# Patient Record
Sex: FEMALE | URBAN - METROPOLITAN AREA
[De-identification: names, ages, dates, MRNs, and addresses within clinical notes are randomized per-mention and may not be internally consistent; named-entity substitution may affect disease eponyms.]

---

## 2023-07-16 ENCOUNTER — INPATIENT (INPATIENT)
Facility: HOSPITAL | Age: 28
LOS: 1 days | Discharge: HOME | End: 2023-07-18
Attending: OBSTETRICS & GYNECOLOGY | Admitting: OBSTETRICS & GYNECOLOGY
Payer: COMMERCIAL

## 2023-07-16 VITALS
DIASTOLIC BLOOD PRESSURE: 80 MMHG | HEART RATE: 127 BPM | TEMPERATURE: 98 F | OXYGEN SATURATION: 97 % | SYSTOLIC BLOOD PRESSURE: 122 MMHG | RESPIRATION RATE: 18 BRPM

## 2023-07-16 DIAGNOSIS — O26.899 OTHER SPECIFIED PREGNANCY RELATED CONDITIONS, UNSPECIFIED TRIMESTER: ICD-10-CM

## 2023-07-16 DIAGNOSIS — Z34.80 ENCOUNTER FOR SUPERVISION OF OTHER NORMAL PREGNANCY, UNSPECIFIED TRIMESTER: ICD-10-CM

## 2023-07-16 DIAGNOSIS — K08.409 PARTIAL LOSS OF TEETH, UNSPECIFIED CAUSE, UNSPECIFIED CLASS: Chronic | ICD-10-CM

## 2023-07-16 LAB
BASOPHILS # BLD AUTO: 0.02 K/UL — SIGNIFICANT CHANGE UP (ref 0–0.2)
BASOPHILS NFR BLD AUTO: 0.1 % — SIGNIFICANT CHANGE UP (ref 0–2)
COVID-19 SPIKE DOMAIN AB INTERP: POSITIVE
COVID-19 SPIKE DOMAIN ANTIBODY RESULT: >250 U/ML — HIGH
EOSINOPHIL # BLD AUTO: 0.01 K/UL — SIGNIFICANT CHANGE UP (ref 0–0.5)
EOSINOPHIL NFR BLD AUTO: 0.1 % — SIGNIFICANT CHANGE UP (ref 0–6)
HCT VFR BLD CALC: 39.9 % — SIGNIFICANT CHANGE UP (ref 34.5–45)
HGB BLD-MCNC: 13 G/DL — SIGNIFICANT CHANGE UP (ref 11.5–15.5)
IMM GRANULOCYTES NFR BLD AUTO: 0.7 % — SIGNIFICANT CHANGE UP (ref 0–0.9)
LYMPHOCYTES # BLD AUTO: 1.42 K/UL — SIGNIFICANT CHANGE UP (ref 1–3.3)
LYMPHOCYTES # BLD AUTO: 9.2 % — LOW (ref 13–44)
MCHC RBC-ENTMCNC: 30.5 PG — SIGNIFICANT CHANGE UP (ref 27–34)
MCHC RBC-ENTMCNC: 32.6 GM/DL — SIGNIFICANT CHANGE UP (ref 32–36)
MCV RBC AUTO: 93.7 FL — SIGNIFICANT CHANGE UP (ref 80–100)
MONOCYTES # BLD AUTO: 0.59 K/UL — SIGNIFICANT CHANGE UP (ref 0–0.9)
MONOCYTES NFR BLD AUTO: 3.8 % — SIGNIFICANT CHANGE UP (ref 2–14)
NEUTROPHILS # BLD AUTO: 13.25 K/UL — HIGH (ref 1.8–7.4)
NEUTROPHILS NFR BLD AUTO: 86.1 % — HIGH (ref 43–77)
NRBC # BLD: 0 /100 WBCS — SIGNIFICANT CHANGE UP (ref 0–0)
PLATELET # BLD AUTO: 246 K/UL — SIGNIFICANT CHANGE UP (ref 150–400)
RBC # BLD: 4.26 M/UL — SIGNIFICANT CHANGE UP (ref 3.8–5.2)
RBC # FLD: 13 % — SIGNIFICANT CHANGE UP (ref 10.3–14.5)
SARS-COV-2 IGG+IGM SERPL QL IA: >250 U/ML — HIGH
SARS-COV-2 IGG+IGM SERPL QL IA: POSITIVE
WBC # BLD: 15.4 K/UL — HIGH (ref 3.8–10.5)
WBC # FLD AUTO: 15.4 K/UL — HIGH (ref 3.8–10.5)

## 2023-07-16 PROCEDURE — 86077 PHYS BLOOD BANK SERV XMATCH: CPT

## 2023-07-16 RX ORDER — LANOLIN
1 OINTMENT (GRAM) TOPICAL EVERY 6 HOURS
Refills: 0 | Status: DISCONTINUED | OUTPATIENT
Start: 2023-07-17 | End: 2023-07-18

## 2023-07-16 RX ORDER — NALBUPHINE HYDROCHLORIDE 10 MG/ML
2.5 INJECTION, SOLUTION INTRAMUSCULAR; INTRAVENOUS; SUBCUTANEOUS EVERY 6 HOURS
Refills: 0 | Status: DISCONTINUED | OUTPATIENT
Start: 2023-07-16 | End: 2023-07-17

## 2023-07-16 RX ORDER — OXYCODONE HYDROCHLORIDE 5 MG/1
10 TABLET ORAL
Refills: 0 | Status: DISCONTINUED | OUTPATIENT
Start: 2023-07-16 | End: 2023-07-17

## 2023-07-16 RX ORDER — KETOROLAC TROMETHAMINE 30 MG/ML
30 SYRINGE (ML) INJECTION EVERY 6 HOURS
Refills: 0 | Status: DISCONTINUED | OUTPATIENT
Start: 2023-07-17 | End: 2023-07-18

## 2023-07-16 RX ORDER — HEPARIN SODIUM 5000 [USP'U]/ML
5000 INJECTION INTRAVENOUS; SUBCUTANEOUS EVERY 12 HOURS
Refills: 0 | Status: DISCONTINUED | OUTPATIENT
Start: 2023-07-17 | End: 2023-07-18

## 2023-07-16 RX ORDER — HYDROMORPHONE HYDROCHLORIDE 2 MG/ML
0.5 INJECTION INTRAMUSCULAR; INTRAVENOUS; SUBCUTANEOUS
Refills: 0 | Status: DISCONTINUED | OUTPATIENT
Start: 2023-07-16 | End: 2023-07-17

## 2023-07-16 RX ORDER — CITRIC ACID/SODIUM CITRATE 300-500 MG
15 SOLUTION, ORAL ORAL EVERY 6 HOURS
Refills: 0 | Status: DISCONTINUED | OUTPATIENT
Start: 2023-07-16 | End: 2023-07-17

## 2023-07-16 RX ORDER — DIPHENOXYLATE HCL/ATROPINE 2.5-.025MG
2 TABLET ORAL ONCE
Refills: 0 | Status: DISCONTINUED | OUTPATIENT
Start: 2023-07-16 | End: 2023-07-16

## 2023-07-16 RX ORDER — OXYCODONE HYDROCHLORIDE 5 MG/1
5 TABLET ORAL
Refills: 0 | Status: DISCONTINUED | OUTPATIENT
Start: 2023-07-16 | End: 2023-07-17

## 2023-07-16 RX ORDER — SODIUM CHLORIDE 9 MG/ML
1000 INJECTION, SOLUTION INTRAVENOUS
Refills: 0 | Status: DISCONTINUED | OUTPATIENT
Start: 2023-07-16 | End: 2023-07-18

## 2023-07-16 RX ORDER — OXYCODONE HYDROCHLORIDE 5 MG/1
5 TABLET ORAL
Refills: 0 | Status: DISCONTINUED | OUTPATIENT
Start: 2023-07-17 | End: 2023-07-18

## 2023-07-16 RX ORDER — SODIUM CHLORIDE 9 MG/ML
1000 INJECTION, SOLUTION INTRAVENOUS
Refills: 0 | Status: DISCONTINUED | OUTPATIENT
Start: 2023-07-16 | End: 2023-07-17

## 2023-07-16 RX ORDER — TETANUS TOXOID, REDUCED DIPHTHERIA TOXOID AND ACELLULAR PERTUSSIS VACCINE, ADSORBED 5; 2.5; 8; 8; 2.5 [IU]/.5ML; [IU]/.5ML; UG/.5ML; UG/.5ML; UG/.5ML
0.5 SUSPENSION INTRAMUSCULAR ONCE
Refills: 0 | Status: DISCONTINUED | OUTPATIENT
Start: 2023-07-17 | End: 2023-07-18

## 2023-07-16 RX ORDER — SIMETHICONE 80 MG/1
80 TABLET, CHEWABLE ORAL EVERY 4 HOURS
Refills: 0 | Status: DISCONTINUED | OUTPATIENT
Start: 2023-07-17 | End: 2023-07-18

## 2023-07-16 RX ORDER — OXYTOCIN 10 UNIT/ML
333.33 VIAL (ML) INJECTION
Qty: 20 | Refills: 0 | Status: DISCONTINUED | OUTPATIENT
Start: 2023-07-17 | End: 2023-07-18

## 2023-07-16 RX ORDER — MORPHINE SULFATE 50 MG/1
2 CAPSULE, EXTENDED RELEASE ORAL ONCE
Refills: 0 | Status: DISCONTINUED | OUTPATIENT
Start: 2023-07-16 | End: 2023-07-17

## 2023-07-16 RX ORDER — ONDANSETRON 8 MG/1
4 TABLET, FILM COATED ORAL EVERY 6 HOURS
Refills: 0 | Status: DISCONTINUED | OUTPATIENT
Start: 2023-07-16 | End: 2023-07-17

## 2023-07-16 RX ORDER — NALOXONE HYDROCHLORIDE 4 MG/.1ML
0.1 SPRAY NASAL
Refills: 0 | Status: DISCONTINUED | OUTPATIENT
Start: 2023-07-16 | End: 2023-07-17

## 2023-07-16 RX ORDER — DIPHENHYDRAMINE HCL 50 MG
25 CAPSULE ORAL EVERY 6 HOURS
Refills: 0 | Status: COMPLETED | OUTPATIENT
Start: 2023-07-17 | End: 2024-06-14

## 2023-07-16 RX ORDER — CHLORHEXIDINE GLUCONATE 213 G/1000ML
1 SOLUTION TOPICAL DAILY
Refills: 0 | Status: DISCONTINUED | OUTPATIENT
Start: 2023-07-16 | End: 2023-07-17

## 2023-07-16 RX ORDER — SODIUM CHLORIDE 9 MG/ML
1000 INJECTION, SOLUTION INTRAVENOUS
Refills: 0 | Status: DISCONTINUED | OUTPATIENT
Start: 2023-07-17 | End: 2023-07-18

## 2023-07-16 RX ORDER — MAGNESIUM HYDROXIDE 400 MG/1
30 TABLET, CHEWABLE ORAL
Refills: 0 | Status: DISCONTINUED | OUTPATIENT
Start: 2023-07-17 | End: 2023-07-18

## 2023-07-16 RX ORDER — OXYTOCIN 10 UNIT/ML
333.33 VIAL (ML) INJECTION
Qty: 20 | Refills: 0 | Status: DISCONTINUED | OUTPATIENT
Start: 2023-07-16 | End: 2023-07-18

## 2023-07-16 RX ORDER — OXYTOCIN 10 UNIT/ML
4 VIAL (ML) INJECTION
Qty: 30 | Refills: 0 | Status: DISCONTINUED | OUTPATIENT
Start: 2023-07-16 | End: 2023-07-18

## 2023-07-16 RX ORDER — SODIUM CHLORIDE 9 MG/ML
1000 INJECTION, SOLUTION INTRAVENOUS ONCE
Refills: 0 | Status: COMPLETED | OUTPATIENT
Start: 2023-07-16 | End: 2023-07-16

## 2023-07-16 RX ORDER — SODIUM CHLORIDE 9 MG/ML
3 INJECTION INTRAMUSCULAR; INTRAVENOUS; SUBCUTANEOUS EVERY 8 HOURS
Refills: 0 | Status: DISCONTINUED | OUTPATIENT
Start: 2023-07-16 | End: 2023-07-18

## 2023-07-16 RX ORDER — IBUPROFEN 200 MG
600 TABLET ORAL EVERY 6 HOURS
Refills: 0 | Status: DISCONTINUED | OUTPATIENT
Start: 2023-07-17 | End: 2023-07-18

## 2023-07-16 RX ADMIN — SODIUM CHLORIDE 125 MILLILITER(S): 9 INJECTION, SOLUTION INTRAVENOUS at 15:05

## 2023-07-16 RX ADMIN — Medication 4 MILLIUNIT(S)/MIN: at 15:33

## 2023-07-16 RX ADMIN — SODIUM CHLORIDE 1000 MILLILITER(S): 9 INJECTION, SOLUTION INTRAVENOUS at 13:54

## 2023-07-16 RX ADMIN — Medication 2 TABLET(S): at 22:59

## 2023-07-16 NOTE — PRE-ANESTHESIA EVALUATION ADULT - NSANTHAIRWAYFT_ENT_ALL_CORE
Full ROM of neck  Mouth opening >3 finger breadths  TMD >6cm  No micrognathia or mandibular protrusion

## 2023-07-16 NOTE — OB PROVIDER LABOR PROGRESS NOTE - NS_OBIHIFHRDETAILS_OBGYN_ALL_OB_FT
cat 1
reassuring  cat 1 currently 150 with accels  was 180 with good variability   responded to IVF
cat 1
cat 2
reassuring

## 2023-07-16 NOTE — OB PROVIDER H&P - NSLOWPPHRISK_OBGYN_A_OB
No previous uterine incision/[place] Pregnancy/Less than or equal to 4 previous vaginal births/No known bleeding disorder/No history of postpartum hemorrhage/No other PPH risks indicated

## 2023-07-16 NOTE — OB RN DELIVERY SUMMARY - NS_SEPSISRSKCALC_OBGYN_ALL_OB_FT
EOS calculated successfully. EOS Risk Factor: 0.5/1000 live births ([facility] national incidence); GA=40w4d; Temp=98.78; ROM=4.55; GBS='Negative'; Antibiotics='No antibiotics or any antibiotics < 2 hrs prior to birth'

## 2023-07-16 NOTE — PRE-ANESTHESIA EVALUATION ADULT - NSANTHPEFT_GEN_ALL_CORE
GENERAL: NAD  HEAD:  Atraumatic, Normocephalic  NEUROLOGY:  AAOx3, non-focal  SKIN: No obvious rashes or lesions.

## 2023-07-16 NOTE — OB NEONATOLOGY/PEDIATRICIAN DELIVERY SUMMARY - NSPEDSNEONOTESA_OBGYN_ALL_OB_FT
Peds requested at delivery for thick meconium.  Baby girl, AGA, born on  (22:08) at 40.4 wks via primary CS to a 29 y/o , O- blood type mother. No significant maternal or prenatal history. PNL nr/immune/-, GBS - on . SROM at 17:35 (~4.5 HRS) with thick meconium fluids. Baby emerged vigorous, crying, was w/d/s/s with APGARS of 9/9, deep suctioned, BMx1. Mom would like to breastfeed, undecided on Hep B. Tmax: 37.1C. EOS: 0.13.    Physical Exam:  Gen: NAD, +grimace  HEENT: anterior fontanel open soft and flat, no cleft lip/palate, ears normal set, no ear pits or tags. no lesions in mouth/throat, nares clinically patent  Resp: no increased work of breathing, good air entry b/l, clear to auscultation bilaterally  Cardio: Normal S1/S2, regular rate and rhythm, no murmurs, rubs or gallops  Abd: soft, non tender, non distended, + bowel sounds, umbilical cord with 3 vessels  Neuro: +grasp/suck/lino, normal tone  Extremities: negative devi and ortolani, moving all extremities, full range of motion x 4, no crepitus  Skin: pink, warm  Genitals: Normal female anatomy, [place] 1, anus patent

## 2023-07-16 NOTE — OB PROVIDER DELIVERY SUMMARY - NSPROVIDERDELIVERYNOTE_OBGYN_ALL_OB_FT
Patient fully dilated with arrest of descent  upon hysterotomy, baby DALLIN, no nuchal cord, atraumatic delivery  thick meconium   uterine atony -given methergine, hemabate and cytotec pr  ebl 1200 cc, qbl 936 cc  Apgars as per peds  I/O 2.6 L  uo 50 cc Patient fully dilated with arrest of descent  upon hysterotomy, baby DALLIN, no nuchal cord, atraumatic delivery  thick meconium   uterine atony -given methergine, hemabate and cytotec pr  ebl 1200 cc, qbl 936 cc  Apgars as per peds  I/O 2.6 L  uo 50 cc    Dictation pending

## 2023-07-16 NOTE — OB RN INTRAOPERATIVE NOTE - NSSELHIDDEN_OBGYN_ALL_OB_FT
[NS_DeliveryAttending1_OBGYN_ALL_OB_FT:[id number]==],[NS_DeliveryRN_OBGYN_ALL_OB_FT:[id number]=]

## 2023-07-16 NOTE — OB RN PATIENT PROFILE - SURGICAL SITE INCISION
10/13/2020              9 [facility] Partha Mata         To Whom it may concern:     This is to certify that Vielka Coates had an appointment on 10/13/2020  with Estuardo Talley MD, MD. Pt was seen i no

## 2023-07-16 NOTE — OB PROVIDER H&P - HISTORY OF PRESENT ILLNESS
OB PA Admission Note    29 yo G1 @ 40w4d by EDC of 7/12 presents with contractions since last night. Currently q5-7 min    +FM NO LOF/VB/CTX    PNC: uncomplicated  GBS neg  EFW [id number] by sonogram on 7/13    PMH: none  Meds: PNV, elderberry, vitamins C/D  ALl: NKA  GYN: denies fibroids/cysts/STI/abn pap  OB: primigravida  PSH: none  Social: denies toxic habits  Psych: denies history

## 2023-07-16 NOTE — OB PROVIDER LABOR PROGRESS NOTE - NS_SUBJECTIVE/OBJECTIVE_OBGYN_ALL_OB_FT
tolerating contractions
feeling numb
pt fully dilated and pushing, feeling tired and exhausted
feeling pain left back
pt in pain wanting epidural

## 2023-07-16 NOTE — OB PROVIDER H&P - NSHPPHYSICALEXAM_GEN_ALL_CORE
ICU Vital Signs Last 24 Hrs  T(C): 36.5 (16 Jul 2023 09:56), Max: 36.5 (16 Jul 2023 09:56)  T(F): 97.7 (16 Jul 2023 09:56), Max: 97.7 (16 Jul 2023 09:56)  HR: 99 (16 Jul 2023 10:36) (92 - 127)  BP: 122/80 (16 Jul 2023 10:00) (122/80 - 122/80)  BP(mean): --  ABP: --  ABP(mean): --  RR: 18 (16 Jul 2023 09:56) (18 - 18)  SpO2: 97% (16 Jul 2023 10:36) (93% - 97%)    O2 Parameters below as of 16 Jul 2023 09:56  Patient On (Oxygen Delivery Method): room air        Gen: NAd  Heart: S1S2 RRR  Lungs: CTA b/l  Abd: graVid NTND  Le: no calf tenderness    Sono: vertex    VE 3-4/90/-2    FHT: cat i  [place] irregular

## 2023-07-16 NOTE — OB RN DELIVERY SUMMARY - NSSELHIDDEN_OBGYN_ALL_OB_FT
[NS_DeliveryAttending1_OBGYN_ALL_OB_FT:[id number]==],[NS_DeliveryRN_OBGYN_ALL_OB_FT:[id number]] [NS_DeliveryAttending1_OBGYN_ALL_OB_FT:[id number]==],[NS_DeliveryRN_OBGYN_ALL_OB_FT:[id number]],[NS_DeliveryAssist1_OBGYN_ALL_OB_FT:[id number]=]

## 2023-07-16 NOTE — OB RN TRIAGE NOTE - FALL HARM RISK - UNIVERSAL INTERVENTIONS
Bed in lowest position, wheels locked, appropriate side rails in place/Call bell, personal items and telephone in reach/Instruct patient to call for assistance before getting out of bed or chair/Non-slip footwear when patient is out of bed/Justin to call system/Physically safe environment - no spills, clutter or unnecessary equipment/Purposeful Proactive Rounding/Room/bathroom lighting operational, light cord in reach

## 2023-07-16 NOTE — OB PROVIDER H&P - ASSESSMENT
A/P: 27 yo P0 @ 40w4d presents in labor  - admit to L&D  - routine labs  - clear diet then NPO when in active labor  - IV hydration  - fetus: cat i, vertex, continuous monitoring  - GBS neg  - labor- expectant management  - anesthesia consult prn  - anticipate vaginal delivery    Dr Reyes aware    CLINT Bertrand

## 2023-07-16 NOTE — OB PROVIDER DELIVERY SUMMARY - NSSELHIDDEN_OBGYN_ALL_OB_FT
[NS_DeliveryAttending1_OBGYN_ALL_OB_FT:[id number]==] [NS_DeliveryAttending1_OBGYN_ALL_OB_FT:[id number]==],[NS_DeliveryAssist1_OBGYN_ALL_OB_FT:[id number]=]

## 2023-07-16 NOTE — OB PROVIDER LABOR PROGRESS NOTE - ASSESSMENT
P0 at 40+ weeks  in labor  now fully dilated for 2 hours, pushing x 1 hour  with minimal descent  had discussion with patient and family baby with extended vertex/and not much descent despite pushing for 1 hour and fully dilated for 2 hours  rec. abdominal delivery  patient and family to discuss whether to continue pushing  jkmr
P0 at term 40+ weeks  efw 8.12 lb  now 6-7 cm, making change  vertex -3 some molding  reviewed with patient   will rest patient on peanut ball/towards left and ask anesthesia to give more medication  fetus reassuring  observe for progress  jkmr
P0 at 40 + weeks in labor  now fully dilated  will allow to push  efw 8.12 lb  shoulder precautions - nursing and staff aware  jkmr
P0 at 40+ weeks  in labor  had episode of fetal tachycardia  which resolved with fluid bolus  now cat 1 tracing  efw 8.12LB  vertex still -4  patient wants to try to have vaginal delivery  agreed to pitocin augmentation and pitocin started  for epidural  pt examined 3/80/-4  jkmr
P0 at 40 weeks 4 days who presents in labor  fetus reassuring  efw 8.12 lb on 23  will allow pt to progress  patient would like minimal intervention  pain rx upon request  will recheck in several hours  pt aware lga baby  would not allow pt to push if gets to fully for a long period of time if station remains high  if labor protracted would rec.  section  pt does not want intervention at this dre  chika

## 2023-07-17 LAB
BASOPHILS # BLD AUTO: 0 K/UL — SIGNIFICANT CHANGE UP (ref 0–0.2)
BASOPHILS NFR BLD AUTO: 0 % — SIGNIFICANT CHANGE UP (ref 0–2)
EOSINOPHIL # BLD AUTO: 0 K/UL — SIGNIFICANT CHANGE UP (ref 0–0.5)
EOSINOPHIL NFR BLD AUTO: 0 % — SIGNIFICANT CHANGE UP (ref 0–6)
HCT VFR BLD CALC: 28.6 % — LOW (ref 34.5–45)
HGB BLD-MCNC: 9.5 G/DL — LOW (ref 11.5–15.5)
LYMPHOCYTES # BLD AUTO: 0.48 K/UL — LOW (ref 1–3.3)
LYMPHOCYTES # BLD AUTO: 1.7 % — LOW (ref 13–44)
MANUAL SMEAR VERIFICATION: SIGNIFICANT CHANGE UP
MCHC RBC-ENTMCNC: 31.5 PG — SIGNIFICANT CHANGE UP (ref 27–34)
MCHC RBC-ENTMCNC: 33.2 GM/DL — SIGNIFICANT CHANGE UP (ref 32–36)
MCV RBC AUTO: 94.7 FL — SIGNIFICANT CHANGE UP (ref 80–100)
MONOCYTES # BLD AUTO: 1.22 K/UL — HIGH (ref 0–0.9)
MONOCYTES NFR BLD AUTO: 4.3 % — SIGNIFICANT CHANGE UP (ref 2–14)
MYELOCYTES NFR BLD: 0.9 % — HIGH (ref 0–0)
NEUTROPHILS # BLD AUTO: 26.38 K/UL — HIGH (ref 1.8–7.4)
NEUTROPHILS NFR BLD AUTO: 92.2 % — HIGH (ref 43–77)
NEUTS BAND # BLD: 0.9 % — SIGNIFICANT CHANGE UP (ref 0–8)
PLAT MORPH BLD: NORMAL — SIGNIFICANT CHANGE UP
PLATELET # BLD AUTO: 188 K/UL — SIGNIFICANT CHANGE UP (ref 150–400)
RBC # BLD: 3.02 M/UL — LOW (ref 3.8–5.2)
RBC # FLD: 13.1 % — SIGNIFICANT CHANGE UP (ref 10.3–14.5)
RBC BLD AUTO: SIGNIFICANT CHANGE UP
T PALLIDUM AB TITR SER: NEGATIVE — SIGNIFICANT CHANGE UP
WBC # BLD: 28.34 K/UL — HIGH (ref 3.8–10.5)
WBC # FLD AUTO: 28.34 K/UL — HIGH (ref 3.8–10.5)

## 2023-07-17 RX ORDER — DIPHENHYDRAMINE HCL 50 MG
25 CAPSULE ORAL EVERY 6 HOURS
Refills: 0 | Status: DISCONTINUED | OUTPATIENT
Start: 2023-07-17 | End: 2023-07-18

## 2023-07-17 RX ORDER — ASCORBIC ACID 60 MG
500 TABLET,CHEWABLE ORAL THREE TIMES A DAY
Refills: 0 | Status: DISCONTINUED | OUTPATIENT
Start: 2023-07-17 | End: 2023-07-18

## 2023-07-17 RX ORDER — FERROUS SULFATE 325(65) MG
325 TABLET ORAL THREE TIMES A DAY
Refills: 0 | Status: DISCONTINUED | OUTPATIENT
Start: 2023-07-17 | End: 2023-07-18

## 2023-07-17 RX ORDER — ACETAMINOPHEN 500 MG
975 TABLET ORAL
Refills: 0 | Status: DISCONTINUED | OUTPATIENT
Start: 2023-07-17 | End: 2023-07-18

## 2023-07-17 RX ORDER — NALBUPHINE HYDROCHLORIDE 10 MG/ML
2.5 INJECTION, SOLUTION INTRAMUSCULAR; INTRAVENOUS; SUBCUTANEOUS ONCE
Refills: 0 | Status: DISCONTINUED | OUTPATIENT
Start: 2023-07-17 | End: 2023-07-18

## 2023-07-17 RX ADMIN — Medication 500 MILLIGRAM(S): at 21:10

## 2023-07-17 RX ADMIN — Medication 30 MILLIGRAM(S): at 21:10

## 2023-07-17 RX ADMIN — Medication 975 MILLIGRAM(S): at 12:40

## 2023-07-17 RX ADMIN — Medication 325 MILLIGRAM(S): at 15:04

## 2023-07-17 RX ADMIN — HEPARIN SODIUM 5000 UNIT(S): 5000 INJECTION INTRAVENOUS; SUBCUTANEOUS at 08:00

## 2023-07-17 RX ADMIN — Medication 30 MILLIGRAM(S): at 08:00

## 2023-07-17 RX ADMIN — Medication 30 MILLIGRAM(S): at 08:45

## 2023-07-17 RX ADMIN — Medication 975 MILLIGRAM(S): at 03:10

## 2023-07-17 RX ADMIN — SODIUM CHLORIDE 125 MILLILITER(S): 9 INJECTION, SOLUTION INTRAVENOUS at 00:16

## 2023-07-17 RX ADMIN — Medication 975 MILLIGRAM(S): at 02:39

## 2023-07-17 RX ADMIN — SODIUM CHLORIDE 3 MILLILITER(S): 9 INJECTION INTRAMUSCULAR; INTRAVENOUS; SUBCUTANEOUS at 15:00

## 2023-07-17 RX ADMIN — Medication 975 MILLIGRAM(S): at 11:50

## 2023-07-17 RX ADMIN — Medication 325 MILLIGRAM(S): at 21:10

## 2023-07-17 RX ADMIN — Medication 30 MILLIGRAM(S): at 15:40

## 2023-07-17 RX ADMIN — Medication 975 MILLIGRAM(S): at 18:55

## 2023-07-17 RX ADMIN — Medication 975 MILLIGRAM(S): at 18:10

## 2023-07-17 RX ADMIN — Medication 30 MILLIGRAM(S): at 15:00

## 2023-07-17 RX ADMIN — Medication 25 MILLIGRAM(S): at 22:36

## 2023-07-17 RX ADMIN — SODIUM CHLORIDE 3 MILLILITER(S): 9 INJECTION INTRAMUSCULAR; INTRAVENOUS; SUBCUTANEOUS at 22:51

## 2023-07-17 RX ADMIN — SODIUM CHLORIDE 3 MILLILITER(S): 9 INJECTION INTRAMUSCULAR; INTRAVENOUS; SUBCUTANEOUS at 05:39

## 2023-07-17 RX ADMIN — Medication 500 MILLIGRAM(S): at 15:04

## 2023-07-17 RX ADMIN — Medication 30 MILLIGRAM(S): at 21:25

## 2023-07-17 RX ADMIN — HEPARIN SODIUM 5000 UNIT(S): 5000 INJECTION INTRAVENOUS; SUBCUTANEOUS at 21:10

## 2023-07-17 NOTE — PROGRESS NOTE ADULT - ATTENDING COMMENTS
I have personally seen and examined the patient.  I fully participated in the care of this patient.  I have made amendments to the documentation where necessary, and agree with the history, physical exam, and plan as documented by the Resident/PA/NP.     POD 1 from primary  section.   baby girl doing well.   routine care.   discussed postoperative recovery.   possible d/c tomorrow.   Tyra KULKARNI

## 2023-07-17 NOTE — CHART NOTE - NSCHARTNOTEFT_GEN_A_CORE
S: Patient seen and evaluated at bedside.  Pt awake and alert resting comfortably in bed. Patient reports pain controlled with analgesia. Pt denies N/V, SOB, CP, palpitations, fever/chills. Tolerating clears.  Not OOB yet.    O:   T(C): 36.5 (23 @ 01:00), Max: 36.5 (23 @ 01:00)  HR: 112 (23 @ 01:00) (104 - 129)  BP: 110/64 (23 @ 01:00) (108/60 - 122/71)  RR: 13 (23 @ 01:00) (13 - 20)  SpO2: 99% (23 @ 01:00) (97% - 100%)  Wt(kg): --    Gen: Resting comfortably in bed, NAD  CV: S1S2, RRR  Lungs: CTA B/L  Abd: Soft, appropriately tender, occasional BS x 4 quadrants. Fundus is firm.    Inc: Clean/dry/intact w/ bandage in place  Appropriate lochia on pad.      A/P: 28y  s/p pLTCS    Neuro: PO Analgesia PRN  CV: Hemodynamically stable.  Monitor VS. CBC in AM.   Pulm: Saturating well on room air.    GI: Advance to regular diet. Anti-emetics PRN.  : Boss in place  FEN: Electrolytes: LR@125cc/hr.   ID: Afebrile  Dispo: Discharge from PACU     KACEY Rolon, PGY-1

## 2023-07-17 NOTE — PROGRESS NOTE ADULT - PROBLEM SELECTOR PLAN 1
Increase OOB  DVT ppx  Dressing removed  Regular diet  AM CBC shows mild anemia-will given Iron/Vitamin C and continue to monitor for signs/symptoms of anemia   Continue routine post op care and pain protocol

## 2023-07-18 VITALS
OXYGEN SATURATION: 96 % | SYSTOLIC BLOOD PRESSURE: 106 MMHG | HEART RATE: 92 BPM | TEMPERATURE: 98 F | DIASTOLIC BLOOD PRESSURE: 74 MMHG | RESPIRATION RATE: 18 BRPM

## 2023-07-18 DIAGNOSIS — D62 ACUTE POSTHEMORRHAGIC ANEMIA: ICD-10-CM

## 2023-07-18 PROCEDURE — 86850 RBC ANTIBODY SCREEN: CPT

## 2023-07-18 PROCEDURE — 86900 BLOOD TYPING SEROLOGIC ABO: CPT

## 2023-07-18 PROCEDURE — 59050 FETAL MONITOR W/REPORT: CPT

## 2023-07-18 PROCEDURE — 36415 COLL VENOUS BLD VENIPUNCTURE: CPT

## 2023-07-18 PROCEDURE — 86870 RBC ANTIBODY IDENTIFICATION: CPT

## 2023-07-18 PROCEDURE — 86769 SARS-COV-2 COVID-19 ANTIBODY: CPT

## 2023-07-18 PROCEDURE — 85025 COMPLETE CBC W/AUTO DIFF WBC: CPT

## 2023-07-18 PROCEDURE — 86780 TREPONEMA PALLIDUM: CPT

## 2023-07-18 PROCEDURE — 86901 BLOOD TYPING SEROLOGIC RH(D): CPT

## 2023-07-18 PROCEDURE — 59025 FETAL NON-STRESS TEST: CPT

## 2023-07-18 RX ORDER — ASCORBIC ACID 60 MG
1 TABLET,CHEWABLE ORAL
Qty: 0 | Refills: 0 | DISCHARGE
Start: 2023-07-18

## 2023-07-18 RX ORDER — FERROUS SULFATE 325(65) MG
1 TABLET ORAL
Qty: 0 | Refills: 0 | DISCHARGE
Start: 2023-07-18

## 2023-07-18 RX ORDER — IBUPROFEN 200 MG
1 TABLET ORAL
Qty: 0 | Refills: 0 | DISCHARGE
Start: 2023-07-18

## 2023-07-18 RX ORDER — ACETAMINOPHEN 500 MG
3 TABLET ORAL
Qty: 0 | Refills: 0 | DISCHARGE
Start: 2023-07-18

## 2023-07-18 RX ADMIN — Medication 30 MILLIGRAM(S): at 03:47

## 2023-07-18 RX ADMIN — Medication 975 MILLIGRAM(S): at 06:58

## 2023-07-18 RX ADMIN — Medication 30 MILLIGRAM(S): at 09:05

## 2023-07-18 RX ADMIN — Medication 975 MILLIGRAM(S): at 00:45

## 2023-07-18 RX ADMIN — Medication 975 MILLIGRAM(S): at 12:39

## 2023-07-18 RX ADMIN — Medication 325 MILLIGRAM(S): at 06:20

## 2023-07-18 RX ADMIN — HEPARIN SODIUM 5000 UNIT(S): 5000 INJECTION INTRAVENOUS; SUBCUTANEOUS at 09:04

## 2023-07-18 RX ADMIN — Medication 30 MILLIGRAM(S): at 09:35

## 2023-07-18 RX ADMIN — Medication 975 MILLIGRAM(S): at 06:20

## 2023-07-18 RX ADMIN — Medication 975 MILLIGRAM(S): at 00:13

## 2023-07-18 RX ADMIN — Medication 30 MILLIGRAM(S): at 04:44

## 2023-07-18 RX ADMIN — Medication 500 MILLIGRAM(S): at 06:20

## 2023-07-18 RX ADMIN — Medication 975 MILLIGRAM(S): at 13:08

## 2023-07-18 NOTE — PROGRESS NOTE ADULT - PROBLEM SELECTOR PLAN 1
#Postpartum State  Increase OOB  PO Pain Protocol  Continue Regular Diet  Continue Routine Postop/Postpartum Care  Discharge Planning    #RH negative  check KB  Rhogam #Postpartum State  Increase OOB  PO Pain Protocol  Continue Regular Diet  Continue Routine Postop/Postpartum Care  Discharge Planning    #RH negative  Baby B negative, no Rhogam needed

## 2023-07-18 NOTE — DISCHARGE NOTE OB - NS MD DC FALL RISK RISK
For information on Fall & Injury Prevention, visit: [email or web address] OR  [email or web address] OR  https://www.cdc.gov/steadi/patient.html

## 2023-07-18 NOTE — DISCHARGE NOTE OB - HOSPITAL COURSE
see hospital record for detailed account  pt ambulating anita regular diet and ready for dc home   anemia cw pp state

## 2023-07-18 NOTE — DISCHARGE NOTE OB - PATIENT PORTAL LINK FT
You can access the FollowMyHealth Patient Portal offered by [facility] by registering at the following website: [email or web address]. By joining [facility]’s FollowMyHealth portal, you will also be able to view your health information using other applications (apps) compatible with our system.

## 2023-07-18 NOTE — DISCHARGE NOTE OB - YES
Telephone Encounter by Aisha Brito RN at 08/30/17 02:30 PM     Author:  Aisha Brito RN Service:  (none) Author Type:  Registered Nurse     Filed:  08/30/17 02:30 PM Encounter Date:  8/30/2017 Status:  Signed     :  Aisha Brito RN (Registered Nurse)            Hold work-in request for Yogi.[MB1.1M]      Revision History        User Key Date/Time User Provider Type Action    > MB1.1 08/30/17 02:30 PM Aisha Brito, RN Registered Nurse Sign    M - Manual             Statement Selected

## 2023-07-18 NOTE — DISCHARGE NOTE OB - CARE PLAN
1 Principal Discharge DX:	 delivery delivered  Assessment and plan of treatment:	see hospital record for detailed account   pt ambulating, anita regular diet and ready for dc home

## 2023-07-18 NOTE — DISCHARGE NOTE OB - MEDICATION SUMMARY - MEDICATIONS TO TAKE
I will START or STAY ON the medications listed below when I get home from the hospital:    ibuprofen 600 mg oral tablet  -- 1 tab(s) by mouth every 6 hours  -- Indication: For  delivery delivered    acetaminophen 325 mg oral tablet  -- 3 tab(s) by mouth 3 times a day  -- Indication: For  delivery delivered    ferrous sulfate 325 mg (65 mg elemental iron) oral tablet  -- 1 tab(s) by mouth 3 times a day  -- Indication: For  delivery delivered    ascorbic acid 500 mg oral tablet  -- 1 tab(s) by mouth 3 times a day  -- Indication: For  delivery delivered

## 2023-07-18 NOTE — DISCHARGE NOTE OB - CARE PROVIDER_API CALL
Luciana Reyes  Obstetrics and Gynecology  [address] [facility], [address] [place] [address]  Phone: [phone]  Fax: [phone]  Follow Up Time:

## 2023-07-18 NOTE — PROGRESS NOTE ADULT - SUBJECTIVE AND OBJECTIVE BOX
Day 1 of Anesthesia Pain Management Service    SUBJECTIVE:  Pain Scale Score:          [X] Refer to charted pain scores    THERAPY: Received PF epidural morphine as above    OBJECTIVE:    Sedation:        	[X] Alert	[ ] Drowsy	[ ] Arousable      [ ] Asleep       [ ] Unresponsive    Side Effects:	[X] None	[ ] Nausea	[ ] Vomiting         [ ] Pruritus  		[ ] Weakness            [ ] Numbness	          [ ] Other:    ASSESSMENT/ PLAN  [X] Patient transitioned to prn analgesics  [X] Pain management per primary service, pain service to sign off   [X]Documentation and Verification of current medications
Day 1 of Anesthesia Pain Management Service    SUBJECTIVE: Doing ok  Pain Scale Score:          [X] Refer to charted pain scores    THERAPY:  s/p   2  mg PF epidural morphine on 7\16\2023      MEDICATIONS  (STANDING):  acetaminophen     Tablet .. 975 milliGRAM(s) Oral <User Schedule>  ascorbic acid 500 milliGRAM(s) Oral three times a day  diphtheria/tetanus/pertussis (acellular) Vaccine (Adacel) 0.5 milliLiter(s) IntraMuscular once  ferrous    sulfate 325 milliGRAM(s) Oral three times a day  heparin   Injectable 5000 Unit(s) SubCutaneous every 12 hours  ibuprofen  Tablet. 600 milliGRAM(s) Oral every 6 hours  ketorolac   Injectable 30 milliGRAM(s) IV Push every 6 hours  lactated ringers. 1000 milliLiter(s) (125 mL/Hr) IV Continuous <Continuous>  lactated ringers. 1000 milliLiter(s) (250 mL/Hr) IV Continuous <Continuous>  morphine PF Epidural 2 milliGRAM(s) Epidural once  oxytocin Infusion 333.333 milliUNIT(s)/Min (1000 mL/Hr) IV Continuous <Continuous>  oxytocin Infusion 333.333 milliUNIT(s)/Min (1000 mL/Hr) IV Continuous <Continuous>  oxytocin Infusion. 4 milliUNIT(s)/Min (4 mL/Hr) IV Continuous <Continuous>  sodium chloride 0.9% lock flush 3 milliLiter(s) IV Push every 8 hours    MEDICATIONS  (PRN):  diphenhydrAMINE 25 milliGRAM(s) Oral every 6 hours PRN Pruritus  HYDROmorphone  Injectable 0.5 milliGRAM(s) IV Push every 3 hours PRN Moderate Pain (4 - 6)  lanolin Ointment 1 Application(s) Topical every 6 hours PRN Sore Nipples  magnesium hydroxide Suspension 30 milliLiter(s) Oral two times a day PRN Constipation  nalbuphine Injectable 2.5 milliGRAM(s) IV Push every 6 hours PRN Pruritus  naloxone Injectable 0.1 milliGRAM(s) IV Push every 3 minutes PRN For ANY of the following changes in patient status:  A. RR LESS THAN 10 breaths per minute, B. Oxygen saturation LESS THAN 90%, C. Sedation score of 6  ondansetron Injectable 4 milliGRAM(s) IV Push every 6 hours PRN Nausea  oxyCODONE    IR 5 milliGRAM(s) Oral every 3 hours PRN Moderate to Severe Pain (4-10)  oxyCODONE    IR 10 milliGRAM(s) Oral every 3 hours PRN Moderate Pain (4 - 6)  oxyCODONE    IR 5 milliGRAM(s) Oral every 3 hours PRN Mild Pain (1 - 3)  simethicone 80 milliGRAM(s) Chew every 4 hours PRN Gas      OBJECTIVE:    Sedation:        	[X] Alert	 [ ] Drowsy	[ ] Arousable      [ ] Asleep       [ ] Unresponsive    Side Effects:	[X] None 	[ ] Nausea	[ ] Vomiting         [ ] Pruritus  		[ ] Weakness            [ ] Numbness	          [ ] Other:    Vital Signs Last 24 Hrs  T(C): 36.7 (17 Jul 2023 04:30), Max: 37.1 (16 Jul 2023 14:49)  T(F): 98.1 (17 Jul 2023 04:30), Max: 98.78 (16 Jul 2023 14:49)  HR: 107 (17 Jul 2023 01:50) (64 - 153)  BP: 109/74 (17 Jul 2023 01:50) (103/58 - 138/62)  BP(mean): 86 (17 Jul 2023 01:50) (77 - 91)  RR: 18 (17 Jul 2023 04:30) (13 - 20)  SpO2: 95% (17 Jul 2023 04:30) (89% - 100%)    Parameters below as of 17 Jul 2023 04:30  Patient On (Oxygen Delivery Method): room air        ASSESSMENT/ PLAN  [X] Patient to be transitioned to prn analgesics after 24 hours  [X] Pain management per primary service, pain service to sign off   [X]Documentation and Verification of current medications
Postpartum Note-  Section POD#1    Allergies: No Known Allergies    Blood Type: O negative   Rubella: Immune  RPR: Negative       S: Patient is a 28y  POD#1 s/p pLTCS with a PPH s/p hemabate, IM methergine and cytotec LA. Total EBL 1200cc.   Patient w/o complaints, pain is controlled.  Pt is OOB, tolerating PO, passing flatus. Lochia WNL.     O:  Vital Signs Last 24 Hrs  T(C): 36.7 (2023 04:30), Max: 37.1 (2023 14:49)  T(F): 98.1 (2023 04:30), Max: 98.78 (2023 14:49)  HR: 107 (2023 01:50) (64 - 153)  BP: 109/74 (2023 01:50) (103/58 - 138/62)  BP(mean): 86 (2023 01:50) (77 - 91)  RR: 18 (2023 04:30) (13 - 20)  SpO2: 95% (2023 04:30) (89% - 100%)    Parameters below as of 2023 04:30  Patient On (Oxygen Delivery Method): room air      I&O's Summary    2023 07:01  -  2023 07:00  --------------------------------------------------------  IN: 3249 mL / OUT: 2886 mL / NET: 363 mL      Gen: NAD  Abdomen: Soft, nontender, non-distended, fundus firm.  Incision: Clean, dry, and intact. Negative erythema/edema/ecchymosis.  Sub Q  Lochia WNL  Ext: PAS in place. Negative Homans B/L    LABS:                          9.5    28.34 )-----------( 188      ( 2023 06:12 )             28.6       A/P:  28y POD#1 s/p pLTCS, doing well.       PMHx: Denies  Current Issues: Anemia due to acute blood loss-pt asx     Increase OOB  DVT ppx  Dressing removed  Regular diet  AM CBC shows mild anemia-will given Iron/Vitamin C and continue to monitor for signs/symptoms of anemia   Continue routine post op care and pain protocol     Aileen Kim PA-C        
Postpartum Note-  Section POD#2    Prenatal Labs  Blood type: O Negative  Rubella IgG: Immune  RPR: Negative          S: Patient w/o complaints, pain is controlled.  Pt is OOB, tolerating PO, not passing flatus, and voiding. Lochia WNL.     O:  Vital Signs Last 24 Hrs  T(C): 36.7 (2023 05:21), Max: 36.9 (2023 09:22)  T(F): 98 (2023 05:21), Max: 98.4 (2023 09:22)  HR: 92 (2023 05:21) (75 - 92)  BP: 106/74 (2023 05:21) (94/65 - 108/69)  BP(mean): --  RR: 18 (2023 05:21) (18 - 18)  SpO2: 96% (2023 05:21) (96% - 97%)    Parameters below as of 2023 05:21  Patient On (Oxygen Delivery Method): room air        Gen: NAD  Abdomen: Soft, nontender, non distended, fundus firm.  Incision: Clean/dry/ intact. no erythema, no ecchymosis.   SubQ     Lochia WNL  Ext: Neg calf tenderness    LABS:               9.5    28.34 )-----------( 188      ( 17 @ 06:12 )             28.6                13.0   15.40 )-----------( 246      ( -16 @ 12:01 )             39.9